# Patient Record
Sex: FEMALE | Race: BLACK OR AFRICAN AMERICAN | NOT HISPANIC OR LATINO | Employment: FULL TIME | URBAN - METROPOLITAN AREA
[De-identification: names, ages, dates, MRNs, and addresses within clinical notes are randomized per-mention and may not be internally consistent; named-entity substitution may affect disease eponyms.]

---

## 2020-01-06 ENCOUNTER — TELEPHONE (OUTPATIENT)
Dept: NEUROSURGERY | Facility: CLINIC | Age: 55
End: 2020-01-06

## 2020-01-06 NOTE — TELEPHONE ENCOUNTER
----- Message from Jasper Emerson sent at 1/6/2020 10:13 AM CST -----  Contact: [facility]      Name of Who is Calling: BARRY HEATH [[id number]]      What is the request in detail: Pt needs an appt was referred by the [facility].Please contact to further discuss and advise.          Can the clinic reply by [facility]: N      What Number to Call Back if not in [facility]: [facility] [phone]

## 2020-01-06 NOTE — TELEPHONE ENCOUNTER
Spoke with Cameron stated he would like to get  patient scheduled to see Dr. Craven for Neurogenic Thoracic Outlet Syndrome. Referral sent over. Inform him that I was not sure if Dr. Craven see patient with that diagnose but will ask him and if so we will schedule patient and that the next available for Dr. craven want be until June. Cameron voiced understanding

## 2020-01-30 ENCOUNTER — TELEPHONE (OUTPATIENT)
Dept: NEUROSURGERY | Facility: CLINIC | Age: 55
End: 2020-01-30

## 2020-01-30 NOTE — TELEPHONE ENCOUNTER
Left voice mail message for Cameron informing him that we still have not received any records on patient for Dr. Craven to review. Left fax number [phone]. For Cameron to send over records for patient.

## 2020-01-30 NOTE — TELEPHONE ENCOUNTER
----- Message from Elidia Garcia sent at 1/30/2020  1:56 PM CST -----  Contact: Cameron Amador (Nurse Practitioner from [facility] in [place], [place])  Name of Who is Calling: Cameron Amador (Nurse Practitioner from [facility] in [place], [place])      What is the request in detail: Would like to speak to clinic in regards to wanting to know if the office will see the patient due to him sending the referral over 3 weeks ago and haven't heard back from the office yet. Please advise.       Can the clinic reply by [facility]: No      What Number to Call Back if not [facility]: cell: [phone]

## 2020-02-04 ENCOUNTER — TELEPHONE (OUTPATIENT)
Dept: NEUROSURGERY | Facility: CLINIC | Age: 55
End: 2020-02-04

## 2020-02-04 NOTE — TELEPHONE ENCOUNTER
Spoke with [facility] NP at [facility] in [place] regarding patient needing to be seen by DR. Craven inform him that my lead Yoselyn will speak with Dr. Gonzalez regarding referral and scheduling patient. Inform him that once records or reviewed and approved by Dr. Gonzalez someone from his office will contact patient for scheduling appointment. Cameron verbalized understanding.